# Patient Record
Sex: MALE | Race: WHITE | URBAN - METROPOLITAN AREA
[De-identification: names, ages, dates, MRNs, and addresses within clinical notes are randomized per-mention and may not be internally consistent; named-entity substitution may affect disease eponyms.]

---

## 2017-04-11 ENCOUNTER — EMERGENCY (EMERGENCY)
Facility: HOSPITAL | Age: 52
LOS: 0 days | Discharge: HOME | End: 2017-04-12

## 2017-04-11 DIAGNOSIS — M54.2 CERVICALGIA: ICD-10-CM

## 2017-04-11 PROBLEM — Z00.00 ENCOUNTER FOR PREVENTIVE HEALTH EXAMINATION: Status: ACTIVE | Noted: 2017-04-11

## 2017-06-27 DIAGNOSIS — E03.9 HYPOTHYROIDISM, UNSPECIFIED: ICD-10-CM

## 2017-06-27 DIAGNOSIS — M54.2 CERVICALGIA: ICD-10-CM
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
## 2017-06-27 DIAGNOSIS — R42 DIZZINESS AND GIDDINESS: ICD-10-CM

## 2017-06-27 DIAGNOSIS — M62.838 OTHER MUSCLE SPASM: ICD-10-CM
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
## 2020-10-23 ENCOUNTER — INPATIENT (INPATIENT)
Facility: HOSPITAL | Age: 55
LOS: 1 days | Discharge: HOME | End: 2020-10-25
Attending: INTERNAL MEDICINE | Admitting: INTERNAL MEDICINE
Payer: COMMERCIAL

## 2020-10-23 VITALS
RESPIRATION RATE: 16 BRPM | HEART RATE: 95 BPM | HEIGHT: 76 IN | OXYGEN SATURATION: 94 % | WEIGHT: 315 LBS | DIASTOLIC BLOOD PRESSURE: 88 MMHG | TEMPERATURE: 99 F | SYSTOLIC BLOOD PRESSURE: 149 MMHG

## 2020-10-23 LAB
ALBUMIN SERPL ELPH-MCNC: 4.2 G/DL — SIGNIFICANT CHANGE UP (ref 3.5–5.2)
ALP SERPL-CCNC: 82 U/L — SIGNIFICANT CHANGE UP (ref 30–115)
ALT FLD-CCNC: 33 U/L — SIGNIFICANT CHANGE UP (ref 0–41)
ANION GAP SERPL CALC-SCNC: 10 MMOL/L — SIGNIFICANT CHANGE UP (ref 7–14)
APTT BLD: 29.5 SEC — SIGNIFICANT CHANGE UP (ref 27–39.2)
AST SERPL-CCNC: 24 U/L — SIGNIFICANT CHANGE UP (ref 0–41)
BASOPHILS # BLD AUTO: 0.05 K/UL — SIGNIFICANT CHANGE UP (ref 0–0.2)
BASOPHILS NFR BLD AUTO: 0.3 % — SIGNIFICANT CHANGE UP (ref 0–1)
BILIRUB SERPL-MCNC: 0.5 MG/DL — SIGNIFICANT CHANGE UP (ref 0.2–1.2)
BLD GP AB SCN SERPL QL: SIGNIFICANT CHANGE UP
BUN SERPL-MCNC: 9 MG/DL — LOW (ref 10–20)
CALCIUM SERPL-MCNC: 9.6 MG/DL — SIGNIFICANT CHANGE UP (ref 8.5–10.1)
CHLORIDE SERPL-SCNC: 101 MMOL/L — SIGNIFICANT CHANGE UP (ref 98–110)
CO2 SERPL-SCNC: 24 MMOL/L — SIGNIFICANT CHANGE UP (ref 17–32)
CREAT SERPL-MCNC: 1.1 MG/DL — SIGNIFICANT CHANGE UP (ref 0.7–1.5)
EOSINOPHIL # BLD AUTO: 0 K/UL — SIGNIFICANT CHANGE UP (ref 0–0.7)
EOSINOPHIL NFR BLD AUTO: 0 % — SIGNIFICANT CHANGE UP (ref 0–8)
GLUCOSE BLDC GLUCOMTR-MCNC: 152 MG/DL — HIGH (ref 70–99)
GLUCOSE SERPL-MCNC: 122 MG/DL — HIGH (ref 70–99)
HCT VFR BLD CALC: 40.7 % — LOW (ref 42–52)
HCT VFR BLD CALC: 42.2 % — SIGNIFICANT CHANGE UP (ref 42–52)
HGB BLD-MCNC: 13.8 G/DL — LOW (ref 14–18)
HGB BLD-MCNC: 13.9 G/DL — LOW (ref 14–18)
IMM GRANULOCYTES NFR BLD AUTO: 0.4 % — HIGH (ref 0.1–0.3)
INR BLD: 1.12 RATIO — SIGNIFICANT CHANGE UP (ref 0.65–1.3)
LYMPHOCYTES # BLD AUTO: 0.78 K/UL — LOW (ref 1.2–3.4)
LYMPHOCYTES # BLD AUTO: 5.4 % — LOW (ref 20.5–51.1)
MAGNESIUM SERPL-MCNC: 2.2 MG/DL — SIGNIFICANT CHANGE UP (ref 1.8–2.4)
MCHC RBC-ENTMCNC: 30.5 PG — SIGNIFICANT CHANGE UP (ref 27–31)
MCHC RBC-ENTMCNC: 30.6 PG — SIGNIFICANT CHANGE UP (ref 27–31)
MCHC RBC-ENTMCNC: 32.9 G/DL — SIGNIFICANT CHANGE UP (ref 32–37)
MCHC RBC-ENTMCNC: 33.9 G/DL — SIGNIFICANT CHANGE UP (ref 32–37)
MCV RBC AUTO: 90.2 FL — SIGNIFICANT CHANGE UP (ref 80–94)
MCV RBC AUTO: 92.5 FL — SIGNIFICANT CHANGE UP (ref 80–94)
MONOCYTES # BLD AUTO: 0.16 K/UL — SIGNIFICANT CHANGE UP (ref 0.1–0.6)
MONOCYTES NFR BLD AUTO: 1.1 % — LOW (ref 1.7–9.3)
NEUTROPHILS # BLD AUTO: 13.29 K/UL — HIGH (ref 1.4–6.5)
NEUTROPHILS NFR BLD AUTO: 92.8 % — HIGH (ref 42.2–75.2)
NRBC # BLD: 0 /100 WBCS — SIGNIFICANT CHANGE UP (ref 0–0)
NRBC # BLD: 0 /100 WBCS — SIGNIFICANT CHANGE UP (ref 0–0)
PHOSPHATE SERPL-MCNC: 3 MG/DL — SIGNIFICANT CHANGE UP (ref 2.1–4.9)
PLATELET # BLD AUTO: 285 K/UL — SIGNIFICANT CHANGE UP (ref 130–400)
PLATELET # BLD AUTO: 294 K/UL — SIGNIFICANT CHANGE UP (ref 130–400)
POTASSIUM SERPL-MCNC: 4.5 MMOL/L — SIGNIFICANT CHANGE UP (ref 3.5–5)
POTASSIUM SERPL-SCNC: 4.5 MMOL/L — SIGNIFICANT CHANGE UP (ref 3.5–5)
PROT SERPL-MCNC: 7.1 G/DL — SIGNIFICANT CHANGE UP (ref 6–8)
PROTHROM AB SERPL-ACNC: 12.9 SEC — HIGH (ref 9.95–12.87)
RBC # BLD: 4.51 M/UL — LOW (ref 4.7–6.1)
RBC # BLD: 4.56 M/UL — LOW (ref 4.7–6.1)
RBC # FLD: 13.1 % — SIGNIFICANT CHANGE UP (ref 11.5–14.5)
RBC # FLD: 13.2 % — SIGNIFICANT CHANGE UP (ref 11.5–14.5)
SODIUM SERPL-SCNC: 135 MMOL/L — SIGNIFICANT CHANGE UP (ref 135–146)
WBC # BLD: 14.34 K/UL — HIGH (ref 4.8–10.8)
WBC # BLD: 16.33 K/UL — HIGH (ref 4.8–10.8)
WBC # FLD AUTO: 14.34 K/UL — HIGH (ref 4.8–10.8)
WBC # FLD AUTO: 16.33 K/UL — HIGH (ref 4.8–10.8)

## 2020-10-23 PROCEDURE — 70491 CT SOFT TISSUE NECK W/DYE: CPT | Mod: 26

## 2020-10-23 PROCEDURE — 99223 1ST HOSP IP/OBS HIGH 75: CPT

## 2020-10-23 PROCEDURE — 93010 ELECTROCARDIOGRAM REPORT: CPT

## 2020-10-23 PROCEDURE — 99291 CRITICAL CARE FIRST HOUR: CPT

## 2020-10-23 RX ORDER — SODIUM CHLORIDE 9 MG/ML
1000 INJECTION, SOLUTION INTRAVENOUS ONCE
Refills: 0 | Status: COMPLETED | OUTPATIENT
Start: 2020-10-23 | End: 2020-10-23

## 2020-10-23 RX ORDER — DEXAMETHASONE 0.5 MG/5ML
4 ELIXIR ORAL EVERY 6 HOURS
Refills: 0 | Status: DISCONTINUED | OUTPATIENT
Start: 2020-10-23 | End: 2020-10-24

## 2020-10-23 RX ORDER — IBUPROFEN 200 MG
600 TABLET ORAL ONCE
Refills: 0 | Status: COMPLETED | OUTPATIENT
Start: 2020-10-23 | End: 2020-10-23

## 2020-10-23 RX ORDER — PENICILLIN G BENZATHINE 1200000 [IU]/2ML
1.2 INJECTION, SUSPENSION INTRAMUSCULAR ONCE
Refills: 0 | Status: COMPLETED | OUTPATIENT
Start: 2020-10-23 | End: 2020-10-23

## 2020-10-23 RX ORDER — AMPICILLIN SODIUM AND SULBACTAM SODIUM 250; 125 MG/ML; MG/ML
3 INJECTION, POWDER, FOR SUSPENSION INTRAMUSCULAR; INTRAVENOUS ONCE
Refills: 0 | Status: COMPLETED | OUTPATIENT
Start: 2020-10-23 | End: 2020-10-23

## 2020-10-23 RX ORDER — AMPICILLIN SODIUM AND SULBACTAM SODIUM 250; 125 MG/ML; MG/ML
3 INJECTION, POWDER, FOR SUSPENSION INTRAMUSCULAR; INTRAVENOUS EVERY 6 HOURS
Refills: 0 | Status: DISCONTINUED | OUTPATIENT
Start: 2020-10-23 | End: 2020-10-23

## 2020-10-23 RX ORDER — DEXAMETHASONE 0.5 MG/5ML
4 ELIXIR ORAL EVERY 6 HOURS
Refills: 0 | Status: DISCONTINUED | OUTPATIENT
Start: 2020-10-23 | End: 2020-10-23

## 2020-10-23 RX ORDER — ATORVASTATIN CALCIUM 80 MG/1
40 TABLET, FILM COATED ORAL AT BEDTIME
Refills: 0 | Status: DISCONTINUED | OUTPATIENT
Start: 2020-10-23 | End: 2020-10-25

## 2020-10-23 RX ORDER — INFLUENZA VIRUS VACCINE 15; 15; 15; 15 UG/.5ML; UG/.5ML; UG/.5ML; UG/.5ML
0.5 SUSPENSION INTRAMUSCULAR ONCE
Refills: 0 | Status: DISCONTINUED | OUTPATIENT
Start: 2020-10-23 | End: 2020-10-25

## 2020-10-23 RX ORDER — AMPICILLIN SODIUM AND SULBACTAM SODIUM 250; 125 MG/ML; MG/ML
3 INJECTION, POWDER, FOR SUSPENSION INTRAMUSCULAR; INTRAVENOUS EVERY 6 HOURS
Refills: 0 | Status: DISCONTINUED | OUTPATIENT
Start: 2020-10-23 | End: 2020-10-25

## 2020-10-23 RX ORDER — KETOROLAC TROMETHAMINE 30 MG/ML
15 SYRINGE (ML) INJECTION EVERY 6 HOURS
Refills: 0 | Status: DISCONTINUED | OUTPATIENT
Start: 2020-10-23 | End: 2020-10-25

## 2020-10-23 RX ORDER — LEVOTHYROXINE SODIUM 125 MCG
200 TABLET ORAL DAILY
Refills: 0 | Status: DISCONTINUED | OUTPATIENT
Start: 2020-10-23 | End: 2020-10-25

## 2020-10-23 RX ORDER — ENOXAPARIN SODIUM 100 MG/ML
40 INJECTION SUBCUTANEOUS DAILY
Refills: 0 | Status: DISCONTINUED | OUTPATIENT
Start: 2020-10-23 | End: 2020-10-25

## 2020-10-23 RX ORDER — CHLORHEXIDINE GLUCONATE 213 G/1000ML
1 SOLUTION TOPICAL
Refills: 0 | Status: DISCONTINUED | OUTPATIENT
Start: 2020-10-23 | End: 2020-10-25

## 2020-10-23 RX ORDER — PANTOPRAZOLE SODIUM 20 MG/1
40 TABLET, DELAYED RELEASE ORAL DAILY
Refills: 0 | Status: DISCONTINUED | OUTPATIENT
Start: 2020-10-23 | End: 2020-10-24

## 2020-10-23 RX ORDER — DEXAMETHASONE 0.5 MG/5ML
10 ELIXIR ORAL ONCE
Refills: 0 | Status: COMPLETED | OUTPATIENT
Start: 2020-10-23 | End: 2020-10-23

## 2020-10-23 RX ORDER — AMPICILLIN SODIUM AND SULBACTAM SODIUM 250; 125 MG/ML; MG/ML
INJECTION, POWDER, FOR SUSPENSION INTRAMUSCULAR; INTRAVENOUS
Refills: 0 | Status: DISCONTINUED | OUTPATIENT
Start: 2020-10-23 | End: 2020-10-23

## 2020-10-23 RX ADMIN — Medication 10 MILLIGRAM(S): at 06:29

## 2020-10-23 RX ADMIN — Medication 4 MILLIGRAM(S): at 13:46

## 2020-10-23 RX ADMIN — AMPICILLIN SODIUM AND SULBACTAM SODIUM 200 GRAM(S): 250; 125 INJECTION, POWDER, FOR SUSPENSION INTRAMUSCULAR; INTRAVENOUS at 14:56

## 2020-10-23 RX ADMIN — AMPICILLIN SODIUM AND SULBACTAM SODIUM 200 GRAM(S): 250; 125 INJECTION, POWDER, FOR SUSPENSION INTRAMUSCULAR; INTRAVENOUS at 22:45

## 2020-10-23 RX ADMIN — Medication 600 MILLIGRAM(S): at 03:01

## 2020-10-23 RX ADMIN — Medication 600 MILLIGRAM(S): at 07:26

## 2020-10-23 RX ADMIN — PENICILLIN G BENZATHINE 1.2 MILLION UNIT(S): 1200000 INJECTION, SUSPENSION INTRAMUSCULAR at 03:00

## 2020-10-23 RX ADMIN — Medication 100 MILLIGRAM(S): at 06:29

## 2020-10-23 RX ADMIN — PANTOPRAZOLE SODIUM 40 MILLIGRAM(S): 20 TABLET, DELAYED RELEASE ORAL at 13:45

## 2020-10-23 RX ADMIN — ATORVASTATIN CALCIUM 40 MILLIGRAM(S): 80 TABLET, FILM COATED ORAL at 22:45

## 2020-10-23 RX ADMIN — Medication 4 MILLIGRAM(S): at 18:29

## 2020-10-23 RX ADMIN — SODIUM CHLORIDE 1000 MILLILITER(S): 9 INJECTION, SOLUTION INTRAVENOUS at 03:00

## 2020-10-23 RX ADMIN — AMPICILLIN SODIUM AND SULBACTAM SODIUM 200 GRAM(S): 250; 125 INJECTION, POWDER, FOR SUSPENSION INTRAMUSCULAR; INTRAVENOUS at 09:01

## 2020-10-23 NOTE — ED ADULT NURSE NOTE - OBJECTIVE STATEMENT
pt reports sore throat starting 3 days ago after travel back from [place]. pain to throat with swallowing

## 2020-10-23 NOTE — CONSULT NOTE ADULT - ATTENDING COMMENTS
patient seen and examined at bedside. CT images reviewed and discussed.    Phlegmon vs mass,    ABX, repeat CT in 2 weeks.
Seen and examined with the pulmonary fellow at the bed side.  Impression and plan as outlined above.

## 2020-10-23 NOTE — ED PROVIDER NOTE - CARE PLAN
Principal Discharge DX:	Throat swelling  Secondary Diagnosis:	Sore throat  Secondary Diagnosis:	Throat mass

## 2020-10-23 NOTE — ED ADULT TRIAGE NOTE - CHIEF COMPLAINT QUOTE
pt c.o. sore throat and "not feeling well" since yesterday  pt returned on 10/20/2020 from [place]

## 2020-10-23 NOTE — CONSULT NOTE ADULT - ASSESSMENT
IMPRESSION:  Tonsillitis, no abscess seen on 1st scope  Probable EDY  HO Hypothyroid    PLAN:    CNS: pain control with Ketorlac, avoid oversedation    HEENT: Oral care. Decadron IV 4mg q6h. f/u With ENT    PULMONARY:  HOB @ 45 degrees.  Aspiration precautions. O/P pulm follow up for Sleep study    CARDIOVASCULAR: MAP >65.     GI: GI prophylaxis.  NPO for now, follow up with ENT about feeding.  Bowel regimen     RENAL:  Follow up lytes.  Correct as needed    INFECTIOUS DISEASE: Follow up cultures. Unasyn for now    HEMATOLOGICAL:  DVT prophylaxis.     ENDOCRINE:  Follow up FS.  Insulin protocol if needed. Continue home synthroid and     MUSCULOSKELETAL: OOBTC    Dispo:         IMPRESSION:  Sepsis POA (tachy, increased WBC)  Tonsillitis, no abscess seen on 1st scope  Probable EDY  HO Hypothyroid    PLAN:    CNS: pain control with Ketorlac, avoid oversedation    HEENT: Oral care. Decadron IV 4mg q6h. f/u With ENT    PULMONARY:  HOB @ 45 degrees.  Aspiration precautions. O/P pulm follow up for Sleep study    CARDIOVASCULAR: MAP >65.     GI: GI prophylaxis.  NPO for now, follow up with ENT about feeding.  Bowel regimen     RENAL:  Follow up lytes.  Correct as needed    INFECTIOUS DISEASE: Follow up cultures. Unasyn for now    HEMATOLOGICAL:  DVT prophylaxis.     ENDOCRINE:  Follow up FS.  Insulin protocol if needed. Continue home synthroid and     MUSCULOSKELETAL: OOBTC    Dispo:         IMPRESSION:  Sepsis POA (tachy, increased WBC)  Tonsillitis, no abscess seen on 1st scope  Probable EDY  HO Hypothyroid    PLAN:    CNS: pain control with Ketorlac, avoid oversedation    HEENT: Oral care. Decadron IV 4mg q6h. f/u With ENT    PULMONARY:  HOB @ 45 degrees.  Aspiration precautions. O/P pulm follow up for Sleep study    CARDIOVASCULAR: MAP >65.     GI: GI prophylaxis.  NPO for now, follow up with ENT about feeding.  Bowel regimen     RENAL:  Follow up lytes.  Correct as needed    INFECTIOUS DISEASE: Follow up cultures. Unasyn for now    HEMATOLOGICAL:  DVT prophylaxis.     ENDOCRINE:  Follow up FS.  Insulin protocol if needed. Continue home synthroid and     MUSCULOSKELETAL: OOBTC    Dispo: MICU for now         IMPRESSION:  Sepsis POA (tachy, increased WBC)  Tonsillitis, no abscess  Probable EDY  HO Hypothyroid    PLAN:    CNS: NO depressants     HEENT: Oral care. DC Decadron. F/u With ENT    PULMONARY:  HOB @ 45 degrees.  Aspiration precautions. O/P pulm follow up for Sleep study    CARDIOVASCULAR: MAP >65. I=O     GI: GI prophylaxis.  Feeding as tolerated      RENAL:  Follow up lytes.  Correct as needed    INFECTIOUS DISEASE: Follow up cultures. Unasyn for now    HEMATOLOGICAL:  DVT prophylaxis.     ENDOCRINE:  Follow up FS.  Insulin protocol if needed. Continue home synthroid     MUSCULOSKELETAL: OOBTC    Dispo: Transfer to floor

## 2020-10-23 NOTE — ED PROVIDER NOTE - OBJECTIVE STATEMENT
55 y.o. male, no PMH, comes in c/o chills, sore throat, feeling like something is stuck in his throat and difficulty swallowing. No cough. No CP/SOB/abdominal pain. 55 y.o. male, no PMH, comes in c/o chills, sore throat, feeling like something is stuck in his throat and difficulty swallowing. No cough. (+) hoarse voice. No CP/SOB/abdominal pain. No n/v/c/d. No leg pain/swelling.

## 2020-10-23 NOTE — CONSULT NOTE ADULT - SUBJECTIVE AND OBJECTIVE BOX
Patient is a 55y old  Male who presents with a chief complaint of difficulty breathing and sore throat    HPI:  55 year old male with PMH of Hypothyroidism and DLD p/w 2 day h/o sore throat, and subjective fevers. Patient states that his sore throat has been worsening and overnight was having some difficulty breathing came to the ER for further evaluation. CT scan done with prelim read showing Questionable soft tissue lesion versus tonsillar hypertrophy at the level of the lingual tonsil/supraglottic larynx measuring 2 x 1.2 cm causing mass effect on the airway. Also noted are prominent level 1B/2A lymph nodes measuring up to 1.3 cm. Recommend direct visualization. final report read as The palatine tonsils are enlarged (left greater than right) with a striated enhancement pattern consistent with tonsillitis with moderate narrowing of the airway. Bilateral mildly enlarged cervical lymph nodes likely reactive. Patient seen by ENT and scoped, showed some erythema, mild swelling and no airway compromise.       PAST MEDICAL & SURGICAL HISTORY:      SOCIAL HX:   Smoking occasional                        ETOH      drinks occasionally but when he does usually heavy drinking (~ 6 shots)                      Other    FAMILY HISTORY:  :  No known cardiovacular family hisotry     Review Of Systems:     All ROS are negative except per HPI       Allergies    No Known Allergies    Intolerances          PHYSICAL EXAM    ICU Vital Signs Last 24 Hrs  T(C): 36.8 (23 Oct 2020 07:23), Max: 37.2 (23 Oct 2020 01:31)  T(F): 98.2 (23 Oct 2020 07:23), Max: 99 (23 Oct 2020 01:31)  HR: 75 (23 Oct 2020 07:23) (75 - 95)  BP: 112/80 (23 Oct 2020 07:23) (112/80 - 149/88)  BP(mean): --  ABP: --  ABP(mean): --  RR: 19 (23 Oct 2020 07:23) (16 - 19)  SpO2: 95% (23 Oct 2020 07:23) (94% - 96%)      CONSTITUTIONAL:  Well nourished.   NAD    ENT:   Airway patent,   Mouth with erythematous mucosa.   No thrush    CARDIAC:   Normal rate,   Regular rhythm.    +1 edema    Vascular:   Normal systolic impulse    RESPIRATORY:   No wheezing  Bilateral BS   Not tachypneic,  No use of accessory muscles    GASTROINTESTINAL:  Abdomen soft,   Non-tender,   No guarding,     NEUROLOGICAL:   Alert and oriented   No motor deficits.    SKIN:   Skin normal color for race,   No evidence of rash.      HEME LYMPH: .  + cervical  lymphadenopathy.              LABS:                          13.8   16.33 )-----------( 294      ( 23 Oct 2020 02:55 )             40.7                                               10-23    135  |  101  |  9<L>  ----------------------------<  122<H>  4.5   |  24  |  1.1    Ca    9.6      23 Oct 2020 02:55    TPro  7.1  /  Alb  4.2  /  TBili  0.5  /  DBili  x   /  AST  24  /  ALT  33  /  AlkPhos  82  10-23                                                                                           LIVER FUNCTIONS - ( 23 Oct 2020 02:55 )  Alb: 4.2 g/dL / Pro: 7.1 g/dL / ALK PHOS: 82 U/L / ALT: 33 U/L / AST: 24 U/L / GGT: x                                                                                                                                       X-Rays reviewed                                                                                     ECHO    CXR interpreted by me     MEDICATIONS  (STANDING):  ampicillin/sulbactam  IVPB      ampicillin/sulbactam  IVPB 3 Gram(s) IV Intermittent every 6 hours  chlorhexidine 4% Liquid 1 Application(s) Topical <User Schedule>  pantoprazole  Injectable 40 milliGRAM(s) IV Push daily    MEDICATIONS  (PRN):      < from: CT Neck Soft Tissue w/ IV Cont (10.23.20 @ 05:20) >  The palatine tonsils are enlarged (left greater than right) with a striated enhancement pattern consistent with tonsillitis with moderate narrowing of the airway. Bilateral mildly enlarged cervical lymph nodes likely reactive.    < end of copied text >

## 2020-10-23 NOTE — PROGRESS NOTE ADULT - SUBJECTIVE AND OBJECTIVE BOX
ENT DAILY PROGRESS NOTE    Pt is a 55y Male a/w throat pain, tonsillitis (?) - seen and examined at bedside. Pt doing well - sleeping comfortably. Pt states his pain has improved since admission, able to tolerate his saliva better. Pt denies any difficulty breathing or shortness of breath.      REVIEW OF SYSTEMS   [x] A ten-point review of systems was otherwise negative except as noted.  [ ] Due to altered mental status/intubation, subjective information were not able to be obtained from patient. History was obtained, to the extent possible, from review of the chart and collateral sources of information.    Allergies    No Known Allergies    Intolerances        MEDICATIONS:  chlorhexidine 4% Liquid 1 Application(s) Topical <User Schedule>  clindamycin IVPB 600 milliGRAM(s) IV Intermittent every 8 hours  pantoprazole  Injectable 40 milliGRAM(s) IV Push daily      Vital Signs Last 24 Hrs  T(C): 36.8 (23 Oct 2020 07:23), Max: 37.2 (23 Oct 2020 01:31)  T(F): 98.2 (23 Oct 2020 07:23), Max: 99 (23 Oct 2020 01:31)  HR: 80 (23 Oct 2020 09:22) (75 - 95)  BP: 120/78 (23 Oct 2020 09:22) (112/80 - 149/88)  RR: 17 (23 Oct 2020 09:22) (16 - 19)  SpO2: 96% (23 Oct 2020 09:22) (94% - 96%)      PHYSICAL EXAM:    GEN: Well-developed, well-nourished. NAD, awake and alert. No drooling or pooling of secretions. No stridor or stertor. Good vocal quality, no hoarseness.   SKIN: Good color, non diaphoretic  HEENT: NC/AT; Oral mucosa pink and moist. + erythema or edema noted to L tonsil > R. no erythema, edema or bulging noted to the peritonsillar space. no erythema or edema to buccal mucosa, tongue, FOM, uvula or posterior oropharynx. Uvula midline  NECK:  Trachea midline. Neck supple, mild TTP to L lateral neck, no cervical LAD.  RESP: No dyspnea, non-labored breathing. No use of accessory muscles.  CARDIO: +S1/S2  ABDO: Soft, NT.  EXT: LESLIE x 4    LABS:  CBC-                        13.8   16.33 )-----------( 294      ( 23 Oct 2020 02:55 )             40.7     BMP/CMP-  23 Oct 2020 02:55    135    |  101    |  9      ----------------------------<  122    4.5     |  24     |  1.1      Ca    9.6        23 Oct 2020 02:55    TPro  7.1    /  Alb  4.2    /  TBili  0.5    /  DBili  x      /  AST  24     /  ALT  33     /  AlkPhos  82     23 Oct 2020 02:55    Coagulation Studies-    Endocrine Panel-  Calcium, Total Serum: 9.6 mg/dL (10-23 @ 02:55)    RADIOLOGY & ADDITIONAL STUDIES:    < from: CT Neck Soft Tissue w/ IV Cont (10.23.20 @ 05:20) >  EXAM:  CT NECK SOFT TISSUE IC            PROCEDURE DATE:  10/23/2020        INTERPRETATION:  Clinical History / Reason for exam: Sore throat, difficulty swallowing, sensation of something stuck in the throat.    Technique: Multiple contiguous axial images were obtained of the soft tissues of the neck from the superior aspect of the frontal sinuses through to the porter after the intravenous administration of  100 cc Omnipaque 350. Sagittal and coronal reformatted images were submitted.    Comparison: CTA neck dated 4/12/2017.    Findings:    Streak artifact from the patient's dental apparatus limits evaluation at the level of the soft palate and tongue.    The tonsils are enlarged (left greater than right) and there is a striated enhancing pattern consistent with tonsillitis. No discrete enhancing fluid collection is identified. There is moderate narrowing of the airway. Punctate dystrophic calcification within the left tonsil.    Bilateral enlarged level II cervical lymph nodes likely reactive.    Minimal mucosal thickening of the right maxillary sinus.    The parotid glands and submandibular glands are normal in size and CT attenuation.    Mildly enlarged right lobe of the thyroid gland.    Impression:    The palatine tonsils are enlarged (left greater than right) with a striated enhancement pattern consistent with tonsillitis with moderate narrowing of the airway. Bilateral mildly enlarged cervical lymph nodes likely reactive.    Dr. Rissa Saeed discussed preliminaryfindings with GOLDY BOWEN DO on 10/23/2020 5:44 AM with readback.    Final report: Spoke with JERAMY KANG on 10/23/2020 8:53 AM with readback.      RISSA SAEED M.D., RESIDENT RADIOLOGIST  This document has been electronically signed.  ATA CARDENAS MD; Attending Radiologist  This document has been electronically signed. Oct 23 2020  9:00AM

## 2020-10-23 NOTE — ED PROVIDER NOTE - NS ED ROS FT
Denies nausea, vomiting, diarrhea, constipation, abdominal pain, urinary symptoms, chest pain, palpitations, shortness of breath, dyspnea on exertion, syncope/near syncope, cough/URI symptoms, headache, weakness, numbness, focal deficits, visual changes, gait or balance changes, dizziness

## 2020-10-23 NOTE — ED ADULT NURSE NOTE - CHIEF COMPLAINT QUOTE
pt c.o. sore throat and "not feeling well" since yesterday  pt returned on 10/20/2020 from [place]

## 2020-10-23 NOTE — ED PROVIDER NOTE - PHYSICAL EXAMINATION
pt in NAD,   AAOx3,   head NC/AT,   CN II-XII intact,   throat (+) swelling to left tonsil, (+) exudates to posterior aspect, uvula midline, tongue normal size, (-) swelling to throat of the mouth      lungs CTA B/L, CV S1S2 regular, abdomen soft/NT/ND/(+)BS, ext (-) edema. motor 5/5x4, sensation intact pt in NAD,   AAOx3,   head NC/AT,   CN II-XII intact,   throat (+) swelling to left tonsil, (+) exudates to posterior aspect, uvula midline, tongue normal size, (-) swelling to throat of the mouth  (+) cervical LAD  lungs CTA B/L,   CV S1S2 regular,   abdomen soft/NT/ND/(+)BS,   ext (-) edema,   motor 5/5x4, sensation intact

## 2020-10-23 NOTE — H&P ADULT - HISTORY OF PRESENT ILLNESS
Patient is a 55 year old male with PMH of Hypothyroidism, DLD presented to the ED with chief complaint of sore throat, weakness, myalgias for last 2-3days. Patient was having sensation that his airway was collapsing so he decided to come to the ED.  Patient denied fever, chills, recent facial infections, sinus infections, cough, shortness of breath. Patient has history of strep throat several episodes.   In ED, /88mm Hg, HR 95/min, 99F, saturating at 94%. Labs showed WBC 16K. CT scan showed enlarged palatine tonsils (left greater than right) with a striated enhancement pattern consistent with tonsillitis with moderate narrowing of the airway. ENT scoped, showed some erythema, mild swelling and no airway compromise.

## 2020-10-23 NOTE — CONSULT NOTE ADULT - SUBJECTIVE AND OBJECTIVE BOX
HPI: Pt is a 54y/o M presenting with 1 day h/o sore throat, chills, and something stuck in the back of his throat. ENT consulted for possible peritonsillar abscess. Pt seen and examined at bedside. Admits to muffled voice, chills, sore throat, denies fevers     Allergies  No Known Allergies    ROS:   ENT: all negative except as noted in HPI   CV: denies palpitations  Pulm: denies SOB, cough, hemoptysis  GI: denies change in apetite, indigestion, n/v  : denies pertinent urinary symptoms, urgency  Neuro: denies numbness/tingling, loss of sensation  Psych: denies anxiety  MS: denies muscle weakness, instability  Heme: denies easy bruising or bleeding  Endo: denies heat/cold intolerance, excessive sweating  Vascular: denies LE edema    Vital Signs Last 24 Hrs  T(C): 37.2 (23 Oct 2020 06:47), Max: 37.2 (23 Oct 2020 01:31)  T(F): 98.9 (23 Oct 2020 06:47), Max: 99 (23 Oct 2020 01:31)  HR: 76 (23 Oct 2020 06:47) (76 - 95)  BP: 119/78 (23 Oct 2020 06:47) (119/78 - 149/88)  BP(mean): --  RR: 18 (23 Oct 2020 06:47) (16 - 18)  SpO2: 96% (23 Oct 2020 06:47) (94% - 96%)                          13.8   16.33 )-----------( 294      ( 23 Oct 2020 02:55 )             40.7    10-23    135  |  101  |  9<L>  ----------------------------<  122<H>  4.5   |  24  |  1.1    Ca    9.6      23 Oct 2020 02:55    TPro  7.1  /  Alb  4.2  /  TBili  0.5  /  DBili  x   /  AST  24  /  ALT  33  /  AlkPhos  82  10-23       PHYSICAL EXAM:  Gen: awake, alert, NAD. No drooling or pooling of secretions. No trismus.   Skin: No rashes, bruises, or lesions  HEENT: Head NC/AT. B/l EACs clear, TMs intact. Nares bilaterally patent, no blood or discharge noted. Oral cavity no erythema/edema. Tongue wnl. Uvula midline. Posterior oropharynx clear, no discharge/blood noted. Neck supple, trachea midline.   Resp: breathing easily, no stridor, no accessory muscle use   CV: no peripheral edema/cyanosis  GI: soft, nontender, nondistended  Neuro: A&Ox3  Psych: normal mood, normal affect    IMAGING/ADDITIONAL STUDIES: HPI: Pt is a 54y/o M presenting with 1 day h/o sore throat, chills, and something stuck in the back of his throat. ENT consulted for possible peritonsillar abscess. Pt seen and examined at bedside. Admits to muffled voice, chills, sore throat, denies fevers, nausea, vomiting, difficulty swallowing, difficulty breathing, or SOB at this time.     Allergies  No Known Allergies    ROS:   ENT: all negative except as noted in HPI   CV: denies palpitations  Pulm: denies SOB, cough, hemoptysis  GI: denies change in apetite, indigestion, n/v  : denies pertinent urinary symptoms, urgency  Neuro: denies numbness/tingling, loss of sensation  Psych: denies anxiety  MS: denies muscle weakness, instability  Heme: denies easy bruising or bleeding  Endo: denies heat/cold intolerance, excessive sweating  Vascular: denies LE edema    Vital Signs Last 24 Hrs  T(C): 37.2 (23 Oct 2020 06:47), Max: 37.2 (23 Oct 2020 01:31)  T(F): 98.9 (23 Oct 2020 06:47), Max: 99 (23 Oct 2020 01:31)  HR: 76 (23 Oct 2020 06:47) (76 - 95)  BP: 119/78 (23 Oct 2020 06:47) (119/78 - 149/88)  RR: 18 (23 Oct 2020 06:47) (16 - 18)  SpO2: 96% (23 Oct 2020 06:47) (94% - 96%)                        13.8   16.33 )-----------( 294      ( 23 Oct 2020 02:55 )             40.7    10-23    135  |  101  |  9<L>  ----------------------------<  122<H>  4.5   |  24  |  1.1    Ca    9.6      23 Oct 2020 02:55  TPro  7.1  /  Alb  4.2  /  TBili  0.5  /  DBili  x   /  AST  24  /  ALT  33  /  AlkPhos  82  10-23    PHYSICAL EXAM:  Gen: awake, alert, NAD. No drooling or pooling of secretions. No trismus.   Skin: No rashes, bruises, or lesions  HEENT: Head NC/AT.  Nares bilaterally patent, no blood or discharge noted. Oral cavity no erythema/edema. Tongue wnl. Uvula midline. Posterior oropharynx - Left tonsil enlarged with edema to tissues surrounding, mild exudates. Neck supple, trachea midline.   Resp: breathing easily, no stridor, no accessory muscle use   CV: no peripheral edema/cyanosis  GI: soft, nontender, nondistended  Neuro: A&Ox3  Psych: normal mood, normal affect    IMAGING/ADDITIONAL STUDIES:   < from: CT Neck Soft Tissue w/ IV Cont (10.23.20 @ 05:20) >    ******PRELIMINARY REPORT******    ******PRELIMINARY REPORT******          EXAM:  CT NECK SOFT TISSUE IC            PROCEDURE DATE:  10/23/2020          ******PRELIMINARY REPORT******    ******PRELIMINARY REPORT******          INTERPRETATION:  CLINICAL HISTORY: Left tonsillar swelling.    Technique: Multiple contiguous axial images were obtained of the soft tissues of the neck from the superior aspect of the frontal sinuses through to the porter after the intravenous administration of  100 cc Omnipaque 350.    Comparison: CTA soft tissue neck 4/12/2017.    Findings:  Visualized portions of the brain demonstrate no abnormal enhancing masses or lesions. Minimal mucosal thickening of the right maxillary sinus, otherwise visualized paranasal sinuses are well-aerated. The bilateral mastoid air complexes are clear.  Questionable soft tissue lesion versus tonsillar hypertrophy at the level of the lingual tonsils/supraglottic larynx (at the level of C2) measuring 2 x 1.2 cm causing mass effect on the airway. There are prominent level 1b/2A  lymph nodes measuring up to 1.3 cm (3/49).  Globes and lenses are intact.  Bilateral optic nerves and extraocular muscles are symmetric and within normal limits.  Bilateral parotid, submandibular,and sublingual glands are symmetric and enhance homogeneously.  The adenoidal soft tissues, lingual tonsils and palatine tonsils are grossly unremarkable.  The soft tissues of the oral cavity are obscured by metallic streaking artifact.  Soft tissues of the floor of the mouth and tongue base are symmetric and within normal limits.  The pre-epiglottic space is clear.  Bilateral aryepiglottic folds are within normal limits bilateral pyriform sinuses are patent.  The true and false vocal cordsare within normal limits.  Mildly enlarged right thyroid gland. The great vessels of the neck are unremarkable.  Straightening of the normal curvature of the cervical spine. Multilevel degenerative changes of the cervical spine.  Bilateral upper lungs are clear.  The upper heart and mediastinum are within normal limits.    Impression:  Questionable soft tissue lesion versus tonsillar hypertrophy at the level of the lingual tonsil/supraglottic larynx measuring 2 x 1.2 cm causing mass effect on the airway. Also noted are prominent level 1B/2A lymph nodes measuring up to 1.3 cm.  Recommend direct visualization.  Dr. Rissa Saeed discussed preliminary findings with GOLDY BOWEN DO on 10/23/2020 5:44 AM with readback.    ******PRELIMINARY REPORT******    ******PRELIMINARY REPORT******          RISSA SAEED M.D., RESIDENT RADIOLOGIST HPI: Pt is a 56y/o M presenting with 1 day h/o sore throat, chills, and something stuck in the back of his throat. ENT consulted for possible peritonsillar abscess. Pt seen and examined at bedside. Admits to muffled voice, chills, sore throat, denies fevers, nausea, vomiting, difficulty swallowing, difficulty breathing, or SOB at this time.     Allergies  No Known Allergies    ROS:   ENT: all negative except as noted in HPI   CV: denies palpitations  Pulm: denies SOB, cough, hemoptysis  GI: denies change in apetite, indigestion, n/v  : denies pertinent urinary symptoms, urgency  Neuro: denies numbness/tingling, loss of sensation  Psych: denies anxiety  MS: denies muscle weakness, instability  Heme: denies easy bruising or bleeding  Endo: denies heat/cold intolerance, excessive sweating  Vascular: denies LE edema    Vital Signs Last 24 Hrs  T(C): 37.2 (23 Oct 2020 06:47), Max: 37.2 (23 Oct 2020 01:31)  T(F): 98.9 (23 Oct 2020 06:47), Max: 99 (23 Oct 2020 01:31)  HR: 76 (23 Oct 2020 06:47) (76 - 95)  BP: 119/78 (23 Oct 2020 06:47) (119/78 - 149/88)  RR: 18 (23 Oct 2020 06:47) (16 - 18)  SpO2: 96% (23 Oct 2020 06:47) (94% - 96%)                        13.8   16.33 )-----------( 294      ( 23 Oct 2020 02:55 )             40.7    10-23    135  |  101  |  9<L>  ----------------------------<  122<H>  4.5   |  24  |  1.1    Ca    9.6      23 Oct 2020 02:55  TPro  7.1  /  Alb  4.2  /  TBili  0.5  /  DBili  x   /  AST  24  /  ALT  33  /  AlkPhos  82  10-23    PHYSICAL EXAM:  Gen: awake, alert, NAD. No drooling or pooling of secretions. No trismus.   Skin: No rashes, bruises, or lesions  HEENT: Head NC/AT.  Nares bilaterally patent, no blood or discharge noted. Oral cavity no erythema/edema. Tongue wnl. Uvula midline. Posterior oropharynx - Left tonsil enlarged with edema to tissues surrounding, mild exudates. Neck supple, trachea midline.   Resp: breathing easily, no stridor, no accessory muscle use   CV: no peripheral edema/cyanosis  GI: soft, nontender, nondistended  Neuro: A&Ox3  Psych: normal mood, normal affect    FFL done at bedside: no masses noted to nasopharynx, +swelling to posterior oropharynx extending down left laryngeal wall, no masses to HP, VC approximate well.    IMAGING/ADDITIONAL STUDIES:   < from: CT Neck Soft Tissue w/ IV Cont (10.23.20 @ 05:20) >    ******PRELIMINARY REPORT******    ******PRELIMINARY REPORT******          EXAM:  CT NECK SOFT TISSUE IC            PROCEDURE DATE:  10/23/2020        ******PRELIMINARY REPORT******    ******PRELIMINARY REPORT******          INTERPRETATION:  CLINICAL HISTORY: Left tonsillar swelling.    Technique: Multiple contiguous axial images were obtained of the soft tissues of the neck from the superior aspect of the frontal sinuses through to the porter after the intravenous administration of  100 cc Omnipaque 350.    Comparison: CTA soft tissue neck 4/12/2017.    Findings:  Visualized portions of the brain demonstrate no abnormal enhancing masses or lesions. Minimal mucosal thickening of the right maxillary sinus, otherwise visualized paranasal sinuses are well-aerated. The bilateral mastoid air complexes are clear.  Questionable soft tissue lesion versus tonsillar hypertrophy at the level of the lingual tonsils/supraglottic larynx (at the level of C2) measuring 2 x 1.2 cm causing mass effect on the airway. There are prominent level 1b/2A  lymph nodes measuring up to 1.3 cm (3/49).  Globes and lenses are intact.  Bilateral optic nerves and extraocular muscles are symmetric and within normal limits.  Bilateral parotid, submandibular,and sublingual glands are symmetric and enhance homogeneously.  The adenoidal soft tissues, lingual tonsils and palatine tonsils are grossly unremarkable.  The soft tissues of the oral cavity are obscured by metallic streaking artifact.  Soft tissues of the floor of the mouth and tongue base are symmetric and within normal limits.  The pre-epiglottic space is clear.  Bilateral aryepiglottic folds are within normal limits bilateral pyriform sinuses are patent.  The true and false vocal cordsare within normal limits.  Mildly enlarged right thyroid gland. The great vessels of the neck are unremarkable.  Straightening of the normal curvature of the cervical spine. Multilevel degenerative changes of the cervical spine.  Bilateral upper lungs are clear.  The upper heart and mediastinum are within normal limits.    Impression:  Questionable soft tissue lesion versus tonsillar hypertrophy at the level of the lingual tonsil/supraglottic larynx measuring 2 x 1.2 cm causing mass effect on the airway. Also noted are prominent level 1B/2A lymph nodes measuring up to 1.3 cm.  Recommend direct visualization.  Dr. Rissa Saeed discussed preliminary findings with GOLDY BOWEN DO on 10/23/2020 5:44 AM with readback.    ******PRELIMINARY REPORT******    ******PRELIMINARY REPORT******          RISSA SAEED M.D., RESIDENT RADIOLOGIST

## 2020-10-23 NOTE — CONSULT NOTE ADULT - ASSESSMENT
54y/o M with Left tonsillar hypertrophy vs soft tissue mass     - Decadron 10mg x 1, then 8mg q6h x 2 more doses   - IV Clindamycin  - Will re-scope with attending during rounds.

## 2020-10-23 NOTE — PROGRESS NOTE ADULT - ASSESSMENT
Pt is a 55 y.o male with tonsillitis (?) - clinically feeling better with antibiotics and steroids    - cont IV abx  - hold off on Decadron for now  - soft diet as tolerated  - w/d with attng

## 2020-10-23 NOTE — ED PROVIDER NOTE - CLINICAL SUMMARY MEDICAL DECISION MAKING FREE TEXT BOX
Pt with enlarged palatine tonsil with moderate narrowing of the airway. Will admit to ICU for airway monitoring, IV abx and IV steroids.

## 2020-10-23 NOTE — H&P ADULT - ASSESSMENT
Patient is a 55 year old male with PMH of Hypothyroidism, DLD presented to the ED with chief complaint of sore throat, weakness, myalgias for last 2-3days.     ASSESSMENT AND PLAN:    IMPRESSION:      PLAN:    CNS: Avoid Sedatives, pain control with toradol    HEENT: Oral care. Decadron IV 4mg q6h. Enlarged tonsils visible on left side with whitish exudates    PULMONARY: continue decadron. Aspiration precautions. O/P pulm follow up for Sleep study    CARDIOVASCULAR: continue statins for dyslipidemia    GI: GI prophylaxis not indicated, mechanical soft diet    RENAL: I=0, monitor electrolytes and replete as needed    INFECTIOUS DISEASE: Continue Unasyn, Send Blood cultures if febrile    HEMATOLOGICAL:  DVT prophylaxis, Active type and screen    ENDOCRINE:  monitor blood glucose on BMP, continue levothyroxine     #Dispo: ICU monitoring

## 2020-10-24 LAB
ALBUMIN SERPL ELPH-MCNC: 4.1 G/DL — SIGNIFICANT CHANGE UP (ref 3.5–5.2)
ALP SERPL-CCNC: 79 U/L — SIGNIFICANT CHANGE UP (ref 30–115)
ALT FLD-CCNC: 28 U/L — SIGNIFICANT CHANGE UP (ref 0–41)
ANION GAP SERPL CALC-SCNC: 11 MMOL/L — SIGNIFICANT CHANGE UP (ref 7–14)
AST SERPL-CCNC: 15 U/L — SIGNIFICANT CHANGE UP (ref 0–41)
BILIRUB SERPL-MCNC: 0.3 MG/DL — SIGNIFICANT CHANGE UP (ref 0.2–1.2)
BUN SERPL-MCNC: 11 MG/DL — SIGNIFICANT CHANGE UP (ref 10–20)
CALCIUM SERPL-MCNC: 9.2 MG/DL — SIGNIFICANT CHANGE UP (ref 8.5–10.1)
CHLORIDE SERPL-SCNC: 104 MMOL/L — SIGNIFICANT CHANGE UP (ref 98–110)
CO2 SERPL-SCNC: 22 MMOL/L — SIGNIFICANT CHANGE UP (ref 17–32)
CREAT SERPL-MCNC: 0.9 MG/DL — SIGNIFICANT CHANGE UP (ref 0.7–1.5)
GLUCOSE SERPL-MCNC: 143 MG/DL — HIGH (ref 70–99)
HCT VFR BLD CALC: 41.3 % — LOW (ref 42–52)
HGB BLD-MCNC: 13.7 G/DL — LOW (ref 14–18)
MAGNESIUM SERPL-MCNC: 2.1 MG/DL — SIGNIFICANT CHANGE UP (ref 1.8–2.4)
MCHC RBC-ENTMCNC: 30.6 PG — SIGNIFICANT CHANGE UP (ref 27–31)
MCHC RBC-ENTMCNC: 33.2 G/DL — SIGNIFICANT CHANGE UP (ref 32–37)
MCV RBC AUTO: 92.2 FL — SIGNIFICANT CHANGE UP (ref 80–94)
NRBC # BLD: 0 /100 WBCS — SIGNIFICANT CHANGE UP (ref 0–0)
PLATELET # BLD AUTO: 288 K/UL — SIGNIFICANT CHANGE UP (ref 130–400)
POTASSIUM SERPL-MCNC: 4.4 MMOL/L — SIGNIFICANT CHANGE UP (ref 3.5–5)
POTASSIUM SERPL-SCNC: 4.4 MMOL/L — SIGNIFICANT CHANGE UP (ref 3.5–5)
PROT SERPL-MCNC: 6.7 G/DL — SIGNIFICANT CHANGE UP (ref 6–8)
RBC # BLD: 4.48 M/UL — LOW (ref 4.7–6.1)
RBC # FLD: 13 % — SIGNIFICANT CHANGE UP (ref 11.5–14.5)
SARS-COV-2 IGG SERPL QL IA: NEGATIVE — SIGNIFICANT CHANGE UP
SARS-COV-2 IGM SERPL IA-ACNC: <0.1 INDEX — SIGNIFICANT CHANGE UP
SARS-COV-2 RNA SPEC QL NAA+PROBE: SIGNIFICANT CHANGE UP
SODIUM SERPL-SCNC: 137 MMOL/L — SIGNIFICANT CHANGE UP (ref 135–146)
WBC # BLD: 14.98 K/UL — HIGH (ref 4.8–10.8)
WBC # FLD AUTO: 14.98 K/UL — HIGH (ref 4.8–10.8)

## 2020-10-24 RX ORDER — PANTOPRAZOLE SODIUM 20 MG/1
40 TABLET, DELAYED RELEASE ORAL
Refills: 0 | Status: DISCONTINUED | OUTPATIENT
Start: 2020-10-25 | End: 2020-10-25

## 2020-10-24 RX ADMIN — Medication 4 MILLIGRAM(S): at 05:41

## 2020-10-24 RX ADMIN — Medication 4 MILLIGRAM(S): at 00:27

## 2020-10-24 RX ADMIN — ENOXAPARIN SODIUM 40 MILLIGRAM(S): 100 INJECTION SUBCUTANEOUS at 11:39

## 2020-10-24 RX ADMIN — AMPICILLIN SODIUM AND SULBACTAM SODIUM 200 GRAM(S): 250; 125 INJECTION, POWDER, FOR SUSPENSION INTRAMUSCULAR; INTRAVENOUS at 05:40

## 2020-10-24 RX ADMIN — ATORVASTATIN CALCIUM 40 MILLIGRAM(S): 80 TABLET, FILM COATED ORAL at 21:31

## 2020-10-24 RX ADMIN — CHLORHEXIDINE GLUCONATE 1 APPLICATION(S): 213 SOLUTION TOPICAL at 07:27

## 2020-10-24 RX ADMIN — Medication 200 MICROGRAM(S): at 05:40

## 2020-10-24 RX ADMIN — AMPICILLIN SODIUM AND SULBACTAM SODIUM 200 GRAM(S): 250; 125 INJECTION, POWDER, FOR SUSPENSION INTRAMUSCULAR; INTRAVENOUS at 11:39

## 2020-10-24 RX ADMIN — AMPICILLIN SODIUM AND SULBACTAM SODIUM 200 GRAM(S): 250; 125 INJECTION, POWDER, FOR SUSPENSION INTRAMUSCULAR; INTRAVENOUS at 17:25

## 2020-10-24 RX ADMIN — AMPICILLIN SODIUM AND SULBACTAM SODIUM 200 GRAM(S): 250; 125 INJECTION, POWDER, FOR SUSPENSION INTRAMUSCULAR; INTRAVENOUS at 23:43

## 2020-10-24 NOTE — PROGRESS NOTE ADULT - SUBJECTIVE AND OBJECTIVE BOX
ENT DAILY PROGRESS NOTE    Pt is a 54yo male a/w throat pain, tonsillitis. Pt seen and examined at bedside this am. Pt reports significant improvement in pain since starting the abx. He is able to tolerate PO and was able to eat breakfast.  Denies difficulty breathing, drooling, fevers or chills.     REVIEW OF SYSTEMS   [x] A ten-point review of systems was otherwise negative except as noted.  [ ] Due to altered mental status/intubation, subjective information were not able to be obtained from patient. History was obtained, to the extent possible, from review of the chart and collateral sources of information.    Allergies  No Known Allergies    MEDICATIONS:  ampicillin/sulbactam  IVPB 3 Gram(s) IV Intermittent every 6 hours  atorvastatin 40 milliGRAM(s) Oral at bedtime  chlorhexidine 4% Liquid 1 Application(s) Topical <User Schedule>  enoxaparin Injectable 40 milliGRAM(s) SubCutaneous daily  influenza   Vaccine 0.5 milliLiter(s) IntraMuscular once  ketorolac   Injectable 15 milliGRAM(s) IV Push every 6 hours PRN  levothyroxine 200 MICROGram(s) Oral daily    Vital Signs Last 24 Hrs  T(C): 35.7 (24 Oct 2020 08:00), Max: 36.2 (23 Oct 2020 12:05)  T(F): 96.3 (24 Oct 2020 08:00), Max: 97.1 (23 Oct 2020 12:05)  HR: 86 (24 Oct 2020 10:00)   BP: 140/87 (24 Oct 2020 10:00)   BP(mean): 107 (24 Oct 2020 10:00)   RR: 13 (24 Oct 2020 10:00)  SpO2: 94% (24 Oct 2020 10:00)       10-23 @ 07:01  -  10-24 @ 07:00  --------------------------------------------------------  IN:    IV PiggyBack: 100 mL  Total IN: 100 mL    OUT:    Voided (mL): 1300 mL  Total OUT: 1300 mL    Total NET: -1200 mL    PHYSICAL EXAM:  GEN: NAD; No drooling or pooling of secretions  NEURO: Awake and alert  SKIN: Good color, non diaphoretic  HEENT: Oral mucosa pink and moist, +erythema and edema noted to b/l tonsils (L>R), no bulging/edema noted to b/l peritonsillar area; uvula midline; no FOM ttp  RESP: Non-labored breathing  CARDIO: +S1/S2  ABDO: Soft, NT  EXT: LESLIE x 4    LABS:  CBC-                        13.7   14.98 )-----------( 288      ( 24 Oct 2020 04:30 )             41.3     BMP/CMP-  24 Oct 2020 04:30    137    |  104    |  11     ----------------------------<  143    4.4     |  22     |  0.9      Ca    9.2        24 Oct 2020 04:30  Phos  3.0       23 Oct 2020 11:00  Mg     2.1       24 Oct 2020 04:30    TPro  6.7    /  Alb  4.1    /  TBili  0.3    /  DBili  x      /  AST  15     /  ALT  28     /  AlkPhos  79     24 Oct 2020 04:30    Coagulation Studies-  PT/INR - ( 23 Oct 2020 11:00 )   PT: 12.90 sec;   INR: 1.12 ratio      PTT - ( 23 Oct 2020 11:00 )  PTT:29.5 sec  Endocrine Panel-  Calcium, Total Serum: 9.2 mg/dL (10-24 @ 04:30)

## 2020-10-24 NOTE — CHART NOTE - NSCHARTNOTEFT_GEN_A_CORE
ICU DOWNGRADE NOTE:    55y Male transferred to floor from ICU    Patient is a 55y old Male who presents with a chief complaint of Throat swelling (23 Oct 2020 15:46)    The patient is currently admitted for the primary diagnosis of Throat swelling      The patient was admitted to the unit for () Days.    The patient (was/was never) intubated for (days), and (was/was never) on pressors for (days).    Indwelling vascular catheters:    Urinary Catheter:     Disposition:    Code Status:    ICU COURSE OF EVENTS:  -------------------------------------------------------------------------------------------        -------------------------------------------------------------------------------------------    Current workup in progress:    SIGN OUT AT 10-24-20 @ 08:27 GIVEN TO:

## 2020-10-24 NOTE — PROGRESS NOTE ADULT - ASSESSMENT
Pt is a 54 yo M a/w throat pain, tonsilitis- clinically improving and now with resolution of pain.    ·	Cont with abx   ·	Cont with diet as tolerated   ·	Repeat CT in 2 weeks  ·	will d/w attng

## 2020-10-25 ENCOUNTER — TRANSCRIPTION ENCOUNTER (OUTPATIENT)
Age: 55
End: 2020-10-25

## 2020-10-25 VITALS — OXYGEN SATURATION: 98 % | RESPIRATION RATE: 18 BRPM

## 2020-10-25 PROCEDURE — 99239 HOSP IP/OBS DSCHRG MGMT >30: CPT

## 2020-10-25 RX ORDER — LEVOTHYROXINE SODIUM 125 MCG
1 TABLET ORAL
Qty: 0 | Refills: 0 | DISCHARGE

## 2020-10-25 RX ORDER — IBUPROFEN 200 MG
1 TABLET ORAL
Qty: 16 | Refills: 0
Start: 2020-10-25 | End: 2020-10-28

## 2020-10-25 RX ORDER — LEVOTHYROXINE SODIUM 125 MCG
1 TABLET ORAL
Qty: 0 | Refills: 0 | DISCHARGE
Start: 2020-10-25

## 2020-10-25 RX ADMIN — AMPICILLIN SODIUM AND SULBACTAM SODIUM 200 GRAM(S): 250; 125 INJECTION, POWDER, FOR SUSPENSION INTRAMUSCULAR; INTRAVENOUS at 05:46

## 2020-10-25 RX ADMIN — Medication 200 MICROGRAM(S): at 05:45

## 2020-10-25 RX ADMIN — PANTOPRAZOLE SODIUM 40 MILLIGRAM(S): 20 TABLET, DELAYED RELEASE ORAL at 05:45

## 2020-10-25 NOTE — DISCHARGE NOTE PROVIDER - NSDCMRMEDTOKEN_GEN_ALL_CORE_FT
Augmentin 875 mg-125 mg oral tablet: 1 tab(s) orally 2 times a day   ibuprofen 400 mg oral tablet: 1 tab(s) orally every 6 hours, As Needed -for severe pain   levothyroxine 200 mcg (0.2 mg) oral tablet: 1 tab(s) orally once a day  rosuvastatin 10 mg oral tablet: 1 tab(s) orally once a day

## 2020-10-25 NOTE — PROGRESS NOTE ADULT - ASSESSMENT
Pt is a 55y M a/w throat pain, tonsillitis- pt report resolution of throat pain; pt being prepared for d/c home today.     ·	Cont with PO abx upon d/c home  ·	Cont with diet as tolerated   ·	Repeat CT in 2 weeks   ·	f/u OP with ENT  ·	Will d/w attng Pt is a 55y M a/w throat pain, tonsillitis- pt report resolution of throat pain; pt being prepared for d/c home today.     ·	Cont with PO abx upon d/c home  ·	Cont with diet as tolerated   ·	Repeat CT in 2 weeks   ·	f/u OP with ENT; pt given card and instructed to call office to schedule appointment   ·	Will d/w attng

## 2020-10-25 NOTE — DISCHARGE NOTE PROVIDER - NSDCCPCAREPLAN_GEN_ALL_CORE_FT
PRINCIPAL DISCHARGE DIAGNOSIS  Diagnosis: Tonsillitis  Assessment and Plan of Treatment:       SECONDARY DISCHARGE DIAGNOSES  Diagnosis: Sore throat  Assessment and Plan of Treatment:

## 2020-10-25 NOTE — PROGRESS NOTE ADULT - ASSESSMENT
# tonsilitis of left tonsil; reactive adenopathy; sepsis present on admit (WBC and HR 95); afeb  feeling better  abx: augmentin 875mg po q12 x10 days  gargle salt water 3x/day  change toothbrush  advil 400mg po q6 prn pain (OTC)  d/c decadron  CMP nl    # hypothyroid - c/w synth    # DLD - c/w crestor    # COVID swab and Ab neg    Dispo: d/c home today

## 2020-10-25 NOTE — DISCHARGE NOTE NURSING/CASE MANAGEMENT/SOCIAL WORK - PATIENT PORTAL LINK FT
You can access the FollowMyHealth Patient Portal offered by [facility] by registering at the following website: [email or web address]. By joining [employer]’s FollowMyHealth portal, you will also be able to view your health information using other applications (apps) compatible with our system.

## 2020-10-25 NOTE — DISCHARGE NOTE PROVIDER - HOSPITAL COURSE
Pt was found to have enlarged left tonsil w/ exudates. Minor cerv adenopathy. Septic on admit (HR and WBC). Went to ICU initially for airway observation. Pt received IV abx and IV decadron and did very well. Was quickly downgraded to med floor. Continued to do well and was d/c'd home in stable condition.

## 2020-10-25 NOTE — PROGRESS NOTE ADULT - SUBJECTIVE AND OBJECTIVE BOX
ENT DAILY PROGRESS NOTE    Pt is a 55y male a/w throat pain/tonsillitis. Pt seen and examined at bedside this am, he was downgraded to the floor from ICU monitoring yesterday. Pt reports he feels much better, states he does not have anymore pain. Is able to tolerate PO intake. Denies difficulty breathing, fevers/ chills.     REVIEW OF SYSTEMS   [x] A ten-point review of systems was otherwise negative except as noted.  [ ] Due to altered mental status/intubation, subjective information were not able to be obtained from patient. History was obtained, to the extent possible, from review of the chart and collateral sources of information.    Allergies  No Known Allergies    Intolerances    MEDICATIONS:  ampicillin/sulbactam  IVPB 3 Gram(s) IV Intermittent every 6 hours  atorvastatin 40 milliGRAM(s) Oral at bedtime  chlorhexidine 4% Liquid 1 Application(s) Topical <User Schedule>  enoxaparin Injectable 40 milliGRAM(s) SubCutaneous daily  influenza   Vaccine 0.5 milliLiter(s) IntraMuscular once  ketorolac   Injectable 15 milliGRAM(s) IV Push every 6 hours PRN  levothyroxine 200 MICROGram(s) Oral daily  pantoprazole    Tablet 40 milliGRAM(s) Oral before breakfast    Vital Signs Last 24 Hrs  T(C): 35.9 (25 Oct 2020 04:40), Max: 36.4 (24 Oct 2020 11:25)  T(F): 96.6 (25 Oct 2020 04:40), Max: 97.5 (24 Oct 2020 11:25)  HR: 68 (25 Oct 2020 04:40)   BP: 131/81 (25 Oct 2020 04:40)   BP(mean): 107 (24 Oct 2020 10:00)  RR: 18 (25 Oct 2020 04:40)  SpO2: 96% (24 Oct 2020 11:25)    10-24 @ 07:01  -  10-25 @ 07:00  --------------------------------------------------------  IN:  Total IN: 0 mL    OUT:    Voided (mL): 450 mL  Total OUT: 450 mL    Total NET: -450 mL    PHYSICAL EXAM:  GEN: NAD; No drooling/pooling of secretions    SKIN: Good color, non diaphoretic  HEENT: Oral mucosa pink and moist; + edema and erythema to L tonsil; no peritonsillar bulging, ttp or erythema; uvula midline   RESP: Non-labored breathing  CARDIO: +S1/S2  ABDO: Soft, NT  EXT: LESLIE x 4    LABS:  CBC-                        13.7   14.98 )-----------( 288      ( 24 Oct 2020 04:30 )             41.3     BMP/CMP-  24 Oct 2020 04:30    137    |  104    |  11     ----------------------------<  143    4.4     |  22     |  0.9      Ca    9.2        24 Oct 2020 04:30  Phos  3.0       23 Oct 2020 11:00  Mg     2.1       24 Oct 2020 04:30    TPro  6.7    /  Alb  4.1    /  TBili  0.3    /  DBili  x      /  AST  15     /  ALT  28     /  AlkPhos  79     24 Oct 2020 04:30    Coagulation Studies-  PT/INR - ( 23 Oct 2020 11:00 )   PT: 12.90 sec;   INR: 1.12 ratio       PTT - ( 23 Oct 2020 11:00 )  PTT:29.5 sec

## 2020-10-25 NOTE — PROGRESS NOTE ADULT - SUBJECTIVE AND OBJECTIVE BOX
DIANA SHAW  55y  Male  ***My note supersedes ALL resident notes that I sign.  My corrections for their notes are in my note.***    I can be reached directly on [employer] [id number]. My office number is [phone]. My personal cell number is [phone].    INTERVAL EVENTS: Here for f/u of tonsilitis. Pt is feeling much better and would like to go home. Breathing is fine. Can swallow food OK, no choking.    T(F): 96.6 (10-25-20 @ 04:40), Max: 97.5 (10-24-20 @ 11:25)  HR: 68 (10-25-20 @ 04:40) (68 - 86)  BP: 131/81 (10-25-20 @ 04:40) (104/60 - 144/82)  RR: 18 (10-25-20 @ 04:40) (13 - 20)  SpO2: 96% (10-24-20 @ 11:25) (92% - 96%)    Gen: NAD; looks well  HEENT: PERRL, EOMI, mouth: + pharyngeal redness; left tonsil is enlarged; no exudates; minor uvular swelling; slight incr rt tonsil (no exudates); no vesicles, no thrush; nose clr  Neck: minor swelling to left cerv nodes - non-tender, no JVD, thyroid nl  lungs: clr  hrt: s1 s2 rrr no murmur  abd: soft, NT/ND, no HS megaly  ext: no edema, no c/c  neuro: aa ox3, cn intact, can move all 4 ext    LABS:                      13.7    (    92.2   14.98 )-----------( ---------      288      ( 24 Oct 2020 04:30 )             41.3    (    13.0     WBC Count: 14.98 K/uL (10-24-20 @ 04:30) - got decadron  WBC Count: 14.34 K/uL (10-23-20 @ 11:00)  WBC Count: 16.33 K/uL (10-23-20 @ 02:55)    PT/INR - ( 23 Oct 2020 11:00 )   PT: 12.90 sec;   INR: 1.12 ratio    PTT - ( 23 Oct 2020 11:00 )  PTT:29.5 sec    RADIOLOGY & ADDITIONAL TESTS:  < from: CT Neck Soft Tissue w/ IV Cont (10.23.20 @ 05:20) >  Findings:    Streak artifact from the patient's dental apparatus limits evaluation at the level of the soft palate and tongue.    The tonsils are enlarged (left greater than right) and there is a striated enhancing pattern consistent with tonsillitis. No discrete enhancing fluid collection is identified. There is moderate narrowing of the airway. Punctate dystrophic calcification within the left tonsil.    Bilateral enlarged level II cervical lymph nodes likely reactive.    Minimal mucosal thickening of the right maxillary sinus.    The parotid glands and submandibular glands are normal in size and CT attenuation.    Mildly enlarged right lobe of the thyroid gland.    Impression:    The palatine tonsils are enlarged (left greater than right) with a striated enhancement pattern consistent with tonsillitis with moderate narrowing of the airway. Bilateral mildly enlarged cervical lymph nodes likely reactive.    < end of copied text >    MEDICATIONS:  ampicillin/sulbactam  IVPB 3 Gram(s) IV Intermittent every 6 hours    atorvastatin 40 milliGRAM(s) Oral at bedtime  chlorhexidine 4% Liquid 1 Application(s) Topical <User Schedule>  enoxaparin Injectable 40 milliGRAM(s) SubCutaneous daily  influenza   Vaccine 0.5 milliLiter(s) IntraMuscular once  ketorolac   Injectable 15 milliGRAM(s) IV Push every 6 hours PRN  levothyroxine 200 MICROGram(s) Oral daily  pantoprazole    Tablet 40 milliGRAM(s) Oral before breakfast

## 2020-10-25 NOTE — DISCHARGE NOTE PROVIDER - NSDCFUADDINST_GEN_ALL_CORE_FT
Discard old toothbrush and buy new one  Gargle w/ salt water 3x/day Discard old toothbrush and buy new one  Gargle w/ salt water 3x/day  Please follow up with your ENT doctor, Dr. Moscoso. Number was provided.

## 2020-10-25 NOTE — DISCHARGE NOTE PROVIDER - CARE PROVIDER_API CALL
GILLIAN ARRIOLA  [id number]  [address] [facility] [address]  [place] [address]  Phone: ([phone]  Fax: ([phone]  Established Patient  Follow Up Time: 2 weeks   GILLIAN ARRIOLA  [id number]  [address]  [place] [address]  Phone: ()-  Fax: ()-  Established Patient  Follow Up Time: 2 weeks    Alexa Moscoso  [facility]  [address] [place] [address], [address] [place] [address]  Phone: [phone]  Fax: [phone]  Follow Up Time: 2 weeks

## 2020-10-25 NOTE — DISCHARGE NOTE PROVIDER - CARE PROVIDERS DIRECT ADDRESSES
,DirectAddress_Unknown ,DirectAddress_Unknown,[email or web address]

## 2020-10-25 NOTE — DISCHARGE NOTE PROVIDER - PROVIDER TOKENS
PROVIDER:[TOKEN:[[id number]],FOLLOWUP:[2 weeks],ESTABLISHEDPATIENT:[T]] PROVIDER:[TOKEN:[[id number]],FOLLOWUP:[2 weeks],ESTABLISHEDPATIENT:[T]],PROVIDER:[TOKEN:[[id number]],FOLLOWUP:[2 weeks]]

## 2020-10-29 DIAGNOSIS — A41.9 SEPSIS, UNSPECIFIED ORGANISM: ICD-10-CM

## 2020-10-29 DIAGNOSIS — J03.90 ACUTE TONSILLITIS, UNSPECIFIED: ICD-10-CM

## 2020-10-29 DIAGNOSIS — E78.5 HYPERLIPIDEMIA, UNSPECIFIED: ICD-10-CM

## 2020-10-29 DIAGNOSIS — Z20.828 CONTACT WITH AND (SUSPECTED) EXPOSURE TO OTHER VIRAL COMMUNICABLE DISEASES: ICD-10-CM

## 2020-10-29 DIAGNOSIS — Z79.890 HORMONE REPLACEMENT THERAPY: ICD-10-CM

## 2020-10-29 DIAGNOSIS — J02.9 ACUTE PHARYNGITIS, UNSPECIFIED: ICD-10-CM

## 2020-10-29 DIAGNOSIS — E03.9 HYPOTHYROIDISM, UNSPECIFIED: ICD-10-CM

## 2020-10-29 LAB
CULTURE RESULTS: SIGNIFICANT CHANGE UP
SPECIMEN SOURCE: SIGNIFICANT CHANGE UP

## 2020-11-18 ENCOUNTER — EMERGENCY (EMERGENCY)
Facility: HOSPITAL | Age: 55
LOS: 0 days | Discharge: HOME | End: 2020-11-18
Attending: EMERGENCY MEDICINE | Admitting: EMERGENCY MEDICINE
Payer: COMMERCIAL

## 2020-11-18 VITALS
HEIGHT: 76 IN | HEART RATE: 108 BPM | SYSTOLIC BLOOD PRESSURE: 131 MMHG | TEMPERATURE: 99 F | OXYGEN SATURATION: 95 % | WEIGHT: 315 LBS | DIASTOLIC BLOOD PRESSURE: 67 MMHG

## 2020-11-18 DIAGNOSIS — E78.5 HYPERLIPIDEMIA, UNSPECIFIED: ICD-10-CM
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
## 2020-11-18 DIAGNOSIS — Z79.2 LONG TERM (CURRENT) USE OF ANTIBIOTICS: ICD-10-CM

## 2020-11-18 DIAGNOSIS — Z79.899 OTHER LONG TERM (CURRENT) DRUG THERAPY: ICD-10-CM

## 2020-11-18 DIAGNOSIS — M25.571 PAIN IN RIGHT ANKLE AND JOINTS OF RIGHT FOOT: ICD-10-CM

## 2020-11-18 DIAGNOSIS — M25.471 EFFUSION, RIGHT ANKLE: ICD-10-CM
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
## 2020-11-18 DIAGNOSIS — E03.9 HYPOTHYROIDISM, UNSPECIFIED: ICD-10-CM

## 2020-11-18 PROCEDURE — 73610 X-RAY EXAM OF ANKLE: CPT | Mod: 26,RT

## 2020-11-18 PROCEDURE — 29515 APPLICATION SHORT LEG SPLINT: CPT

## 2020-11-18 PROCEDURE — 99283 EMERGENCY DEPT VISIT LOW MDM: CPT | Mod: 25

## 2020-11-18 RX ORDER — IBUPROFEN 200 MG
600 TABLET ORAL ONCE
Refills: 0 | Status: COMPLETED | OUTPATIENT
Start: 2020-11-18 | End: 2020-11-18

## 2020-11-18 RX ADMIN — Medication 600 MILLIGRAM(S): at 05:40

## 2020-11-18 NOTE — ED PROVIDER NOTE - ATTENDING CONTRIBUTION TO CARE
55M p/w R ankle pain x 2d. 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 Able to bear wt but w/worsening pain/swelling to lat ankle since then. No focal numbness or weakness.    PE:  nad  skin warm, dry  ncat  neck supple  rrr nl s1s2 no mrg  ctab no wrr  abd soft ntnd no palpable masses no rgr  back non-tender no cvat  ext- R ankle +ttp/swelling to lat mall, non-ttp med mall, navicular & base of 5th MT, full rom/strength/sensation throughout ankle & foot, dpi, cr<2s, R knee non-ttp; remainder of ext no cce dpi  neuro aaox3 grossly nf exam

## 2020-11-18 NOTE — ED ADULT NURSE NOTE - NSIMPLEMENTINTERV_GEN_ALL_ED
Implemented All Fall with Harm Risk Interventions:  [place] to call system. Call bell, personal items and telephone within reach. Instruct patient to call for assistance. Room bathroom lighting operational. Non-slip footwear when patient is off stretcher. Physically safe environment: no spills, clutter or unnecessary equipment. Stretcher in lowest position, wheels locked, appropriate side rails in place. Provide visual cue, wrist band, yellow gown, etc. Monitor gait and stability. Monitor for mental status changes and reorient to person, place, and time. Review medications for side effects contributing to fall risk. Reinforce activity limits and safety measures with patient and family. Provide visual clues: red socks.

## 2020-11-18 NOTE — ED ADULT NURSE NOTE - OBJECTIVE STATEMENT
Pt presents to ED c/o R ankle pain. Pt stated lost balance yesterday at work and stated "may have sprained ankle". Pt c/o difficulty ambulating. Pt is awake alert and not in any distress.

## 2020-11-18 NOTE — ED PROVIDER NOTE - CLINICAL SUMMARY MEDICAL DECISION MAKING FREE TEXT BOX
traumatic R ankle pain x 2d - analgesia, xr no bony injuries, likely sprain - ace wrap provided, rec RICE, analgesia, return precautions discussed, op Ortho f/u

## 2020-11-18 NOTE — ED PROVIDER NOTE - OBJECTIVE STATEMENT
56 yo M with PMHx of hypothyroidism and HLD presents to the ED c/o moderate right ankle pain s/p twisting injury yesterday. Pt is able to bear weight but states walking makes pain worse. He denies taking medication to improve symptoms. She denies other complaints. Pt denies fever, chills, numbness, weakness.

## 2020-11-18 NOTE — ED ADULT TRIAGE NOTE - CHIEF COMPLAINT QUOTE
it looks like I sprained my right leg, it happened yesterday when I was pulling up steel  (right lower leg/ankle) - patient

## 2020-11-18 NOTE — ED PROVIDER NOTE - NS ED ROS FT
Review of Systems  Constitutional:  No fever, chills.  MS:  (+) right ankle pain  Skin:  No skin rash, pruritis, jaundice, or lesions.  Neuro:  No numbness, weakness.

## 2020-12-11 NOTE — ED ADULT TRIAGE NOTE - TEMPERATURE IN FAHRENHEIT (DEGREES F)
Department of Plastic Surgery - Adult  Attending Consult Note          CHIEF COMPLAINT:  Abdominal skin and adipose laxity    History Obtained From:  patient    BMI: 36.75    HISTORY OF PRESENT ILLNESS:                The patient is a 57 y.o. male who presents with excess abdominal skin. The patient states that they have had their abdominal panniculusand laxity for several years. The panus is a result of massive weight los. They state that they are at a stable weight at this time and have had a history of over 250l weight loss over the past several years. He has done keto dieting for over 2 years. The panniculus is associated with, rash, wounding, pruritis, and masceration of the dermis. The patient does complain of intertrtigo with dermatitis occuring on the opposed surface of the skin. The patient does have a history of infection. The patient states that the panus hangs below the level of the pubis, and has  had a history of associated genital infection. The patient states that their associated syptoms have been recurrent for over 3 months with conervative therapy for these wounds. The patient states that secondary to their symptomatic abdominal panniculus they have difficulty with keeping good hygine on a daily basis , and this has been going on for greater than 3 months. After the patient described his abdominal panniculus he states that his main concern is his symptomatic mons pubis. He states that after his deflation of the mons pubis after large weight loss this is most concerning areas this is causing him rash and wounding to his bilateral groins.     Past Medical History:    Past Medical History:   Diagnosis Date    Bipolar disorder, current episode mixed, mild ([address] 10/20/2020    Diabetes mellitus ([address]     PTSD (post-traumatic stress disorder) 10/20/2020     Past Surgical History:    Past Surgical History:   Procedure Laterality Date    APPENDECTOMY       Current Medications: Current Outpatient Medications   Medication Sig Dispense Refill    QUEtiapine (SEROQUEL XR) 50 MG extended release tablet Take 1 tablet by mouth nightly (Patient not taking: Reported on 12/11/2020) 30 tablet 2    escitalopram (LEXAPRO) 10 MG tablet Take 1 tablet by mouth daily (Patient not taking: Reported on 12/11/2020) 30 tablet 5    gabapentin (NEURONTIN) 300 MG capsule Take 1 capsule by mouth nightly for 180 days. Intended supply: 30 days (Patient not taking: Reported on 12/11/2020) 30 capsule 5    metFORMIN (GLUCOPHAGE-XR) 500 MG extended release tablet Take 1 tablet by mouth daily (with breakfast) (Patient not taking: Reported on 12/11/2020) 30 tablet 5     No current facility-administered medications for this visit. Allergies:  Patient has no known allergies. Social History:   Social History     Socioeconomic History    Marital status:      Spouse name: Not on file    Number of children: Not on file    Years of education: Not on file    Highest education level: Not on file   Occupational History    Not on file   Social Needs    Financial resource strain: Not on file    Food insecurity     Worry: Not on file     Inability: Not on file   [employer] needs     Medical: Not on file     Non-medical: Not on file   Tobacco Use    Smoking status: Never Smoker    Smokeless tobacco: Never Used   Substance and Sexual Activity    Alcohol use:  Yes     Alcohol/week: 8.0 standard drinks     Types: 8 Cans of beer per week    Drug use: No    Sexual activity: Yes   Lifestyle    Physical activity     Days per week: Not on file     Minutes per session: Not on file    Stress: Not on file   Relationships    Social connections     Talks on phone: Not on file     Gets together: Not on file     Attends Nondenominational service: Not on file     Active member of club or organization: Not on file     Attends meetings of clubs or organizations: Not on file     Relationship status: Not on file   [facility] Intimate partner violence     Fear of current or ex partner: Not on file     Emotionally abused: Not on file     Physically abused: Not on file     Forced sexual activity: Not on file   Other Topics Concern    Not on file   Social History Narrative    Not on file     Family History:   Family History   Problem Relation Age of Onset    Diabetes Brother     Obesity Mother     Heart Disease Brother     Obesity Maternal Grandfather        REVIEW OF SYSTEMS:    CONSTITUTIONAL:  negative for  fevers, chills, sweats and fatigue  EYES: negative for dipolpia or acute vision loss. RESPIRATORY:  negative for  dry cough, cough with sputum, dyspnea, wheezing and chest pain  CARDIOVASCULAR:  negative for  chest pain, dyspnea, palpitations, syncope  GASTROINTESTINAL:  negative for nausea, vomiting, change in bowel habits, diarrhea, constipation and abdominal pain  EXTREMITIES: negative for edema  MUSCULOSKELETAL: negative for muscle weakness  SKIN: negative for itching or rashes. BEHAVIOR/PSYCH:  negative for poor appetite, increased appetite, decreased sleep and poor concentration    All other systems negative      PHYSICAL EXAM:    VITALS:  /82 (Site: Left Upper Arm, Position: Sitting, Cuff Size: Large Adult)   Pulse 81   Temp 98.6 °F (37 °C) (Temporal)   Resp 16   Ht 6' (1.829 m)   Wt 271 lb (122.9 kg)   SpO2 97%   BMI 36.75 kg/m²   CONSTITUTIONAL:  awake, alert, cooperative, no apparent distress, and appears stated age  EYES: PERRLA, EOMI, no signs of occular infection  LUNGS:  No increased work of breathing, good air exchange,   CARDIOVASCULAR:  Normal apical impulse, regular rate and rhythm,   ABDOMEN:  scars noted, normal bowel sounds, soft, non-distended, non-tender, no masses palpated, hernia absent. The patient does exhibit an abdominal pannus which does  hang below the level of the mons pubis and over the genital area.   There is noted hyperhidrosis to the opposing skin folds of the abdominal 98.6

## 2022-01-27 ENCOUNTER — TRANSCRIPTION ENCOUNTER (OUTPATIENT)
Age: 57
End: 2022-01-27

## 2023-04-22 NOTE — ED PROVIDER NOTE - CARE PROVIDER_API CALL
Chas Hanna  ORTHOPAEDIC SURGERY  [address] [place] [address]  Phone: [phone]  Fax: [phone]  Follow Up Time: 1-3 Days   Applied

## 2023-10-02 ENCOUNTER — EMERGENCY (EMERGENCY)
Facility: HOSPITAL | Age: 58
LOS: 0 days | Discharge: HOME | End: 2023-10-02
Attending: EMERGENCY MEDICINE
Payer: COMMERCIAL

## 2023-10-02 VITALS
WEIGHT: 315 LBS | SYSTOLIC BLOOD PRESSURE: 150 MMHG | HEART RATE: 92 BPM | DIASTOLIC BLOOD PRESSURE: 104 MMHG | OXYGEN SATURATION: 99 % | RESPIRATION RATE: 18 BRPM | HEIGHT: 76 IN | TEMPERATURE: 96 F

## 2023-10-02 DIAGNOSIS — M25.562 PAIN IN LEFT KNEE: ICD-10-CM

## 2023-10-02 DIAGNOSIS — E78.5 HYPERLIPIDEMIA, UNSPECIFIED: ICD-10-CM

## 2023-10-02 DIAGNOSIS — R07.89 OTHER CHEST PAIN: ICD-10-CM

## 2023-10-02 DIAGNOSIS — E03.9 HYPOTHYROIDISM, UNSPECIFIED: ICD-10-CM

## 2023-10-02 LAB
ALBUMIN SERPL ELPH-MCNC: 4.5 G/DL — SIGNIFICANT CHANGE UP (ref 3.5–5.2)
ALP SERPL-CCNC: 66 U/L — SIGNIFICANT CHANGE UP (ref 30–115)
ALT FLD-CCNC: 35 U/L — SIGNIFICANT CHANGE UP (ref 0–41)
ANION GAP SERPL CALC-SCNC: 9 MMOL/L — SIGNIFICANT CHANGE UP (ref 7–14)
AST SERPL-CCNC: 43 U/L — HIGH (ref 0–41)
BASOPHILS # BLD AUTO: 0.06 K/UL — SIGNIFICANT CHANGE UP (ref 0–0.2)
BASOPHILS NFR BLD AUTO: 0.7 % — SIGNIFICANT CHANGE UP (ref 0–1)
BILIRUB SERPL-MCNC: 0.2 MG/DL — SIGNIFICANT CHANGE UP (ref 0.2–1.2)
BUN SERPL-MCNC: 18 MG/DL — SIGNIFICANT CHANGE UP (ref 10–20)
CALCIUM SERPL-MCNC: 9.5 MG/DL — SIGNIFICANT CHANGE UP (ref 8.4–10.5)
CHLORIDE SERPL-SCNC: 100 MMOL/L — SIGNIFICANT CHANGE UP (ref 98–110)
CO2 SERPL-SCNC: 25 MMOL/L — SIGNIFICANT CHANGE UP (ref 17–32)
CREAT SERPL-MCNC: 1.1 MG/DL — SIGNIFICANT CHANGE UP (ref 0.7–1.5)
EGFR: 79 ML/MIN/1.73M2 — SIGNIFICANT CHANGE UP
EOSINOPHIL # BLD AUTO: 0.13 K/UL — SIGNIFICANT CHANGE UP (ref 0–0.7)
EOSINOPHIL NFR BLD AUTO: 1.6 % — SIGNIFICANT CHANGE UP (ref 0–8)
GLUCOSE SERPL-MCNC: 97 MG/DL — SIGNIFICANT CHANGE UP (ref 70–99)
HCT VFR BLD CALC: 40.8 % — LOW (ref 42–52)
HGB BLD-MCNC: 14.2 G/DL — SIGNIFICANT CHANGE UP (ref 14–18)
IMM GRANULOCYTES NFR BLD AUTO: 0.4 % — HIGH (ref 0.1–0.3)
LYMPHOCYTES # BLD AUTO: 1.86 K/UL — SIGNIFICANT CHANGE UP (ref 1.2–3.4)
LYMPHOCYTES # BLD AUTO: 22.4 % — SIGNIFICANT CHANGE UP (ref 20.5–51.1)
MCHC RBC-ENTMCNC: 31.1 PG — HIGH (ref 27–31)
MCHC RBC-ENTMCNC: 34.8 G/DL — SIGNIFICANT CHANGE UP (ref 32–37)
MCV RBC AUTO: 89.3 FL — SIGNIFICANT CHANGE UP (ref 80–94)
MONOCYTES # BLD AUTO: 0.84 K/UL — HIGH (ref 0.1–0.6)
MONOCYTES NFR BLD AUTO: 10.1 % — HIGH (ref 1.7–9.3)
NEUTROPHILS # BLD AUTO: 5.39 K/UL — SIGNIFICANT CHANGE UP (ref 1.4–6.5)
NEUTROPHILS NFR BLD AUTO: 64.8 % — SIGNIFICANT CHANGE UP (ref 42.2–75.2)
NRBC # BLD: 0 /100 WBCS — SIGNIFICANT CHANGE UP (ref 0–0)
PLATELET # BLD AUTO: 291 K/UL — SIGNIFICANT CHANGE UP (ref 130–400)
PMV BLD: 10.1 FL — SIGNIFICANT CHANGE UP (ref 7.4–10.4)
POTASSIUM SERPL-MCNC: 5.7 MMOL/L — HIGH (ref 3.5–5)
POTASSIUM SERPL-SCNC: 5.7 MMOL/L — HIGH (ref 3.5–5)
PROT SERPL-MCNC: 7.2 G/DL — SIGNIFICANT CHANGE UP (ref 6–8)
RBC # BLD: 4.57 M/UL — LOW (ref 4.7–6.1)
RBC # FLD: 13.2 % — SIGNIFICANT CHANGE UP (ref 11.5–14.5)
SODIUM SERPL-SCNC: 134 MMOL/L — LOW (ref 135–146)
TROPONIN T SERPL-MCNC: <0.01 NG/ML — SIGNIFICANT CHANGE UP
WBC # BLD: 8.31 K/UL — SIGNIFICANT CHANGE UP (ref 4.8–10.8)
WBC # FLD AUTO: 8.31 K/UL — SIGNIFICANT CHANGE UP (ref 4.8–10.8)

## 2023-10-02 PROCEDURE — 80053 COMPREHEN METABOLIC PANEL: CPT

## 2023-10-02 PROCEDURE — 99285 EMERGENCY DEPT VISIT HI MDM: CPT | Mod: 25

## 2023-10-02 PROCEDURE — 84484 ASSAY OF TROPONIN QUANT: CPT

## 2023-10-02 PROCEDURE — 93005 ELECTROCARDIOGRAM TRACING: CPT

## 2023-10-02 PROCEDURE — 93010 ELECTROCARDIOGRAM REPORT: CPT

## 2023-10-02 PROCEDURE — 71046 X-RAY EXAM CHEST 2 VIEWS: CPT

## 2023-10-02 PROCEDURE — 71260 CT THORAX DX C+: CPT | Mod: MA

## 2023-10-02 PROCEDURE — 73030 X-RAY EXAM OF SHOULDER: CPT | Mod: 26,LT

## 2023-10-02 PROCEDURE — 99285 EMERGENCY DEPT VISIT HI MDM: CPT

## 2023-10-02 PROCEDURE — 85025 COMPLETE CBC W/AUTO DIFF WBC: CPT

## 2023-10-02 PROCEDURE — 71260 CT THORAX DX C+: CPT | Mod: 26,MA

## 2023-10-02 PROCEDURE — 71046 X-RAY EXAM CHEST 2 VIEWS: CPT | Mod: 26

## 2023-10-02 PROCEDURE — 73030 X-RAY EXAM OF SHOULDER: CPT | Mod: LT

## 2023-10-02 PROCEDURE — 36415 COLL VENOUS BLD VENIPUNCTURE: CPT

## 2023-10-02 PROCEDURE — 96372 THER/PROPH/DIAG INJ SC/IM: CPT | Mod: XU

## 2023-10-02 RX ORDER — KETOROLAC TROMETHAMINE 30 MG/ML
60 SYRINGE (ML) INJECTION ONCE
Refills: 0 | Status: DISCONTINUED | OUTPATIENT
Start: 2023-10-02 | End: 2023-10-02

## 2023-10-02 RX ORDER — ACETAMINOPHEN 500 MG
650 TABLET ORAL ONCE
Refills: 0 | Status: COMPLETED | OUTPATIENT
Start: 2023-10-02 | End: 2023-10-02

## 2023-10-02 RX ORDER — ROSUVASTATIN CALCIUM 5 MG/1
1 TABLET ORAL
Qty: 0 | Refills: 0 | DISCHARGE

## 2023-10-02 RX ADMIN — Medication 60 MILLIGRAM(S): at 16:13

## 2023-10-02 RX ADMIN — Medication 650 MILLIGRAM(S): at 15:30

## 2023-10-02 NOTE — ED PROVIDER NOTE - OBJECTIVE STATEMENT
58 year old male with pmhx of hypothyroidism presents to ed with left shoulder and anterior chest wall pain. pt admits metal beam fell onto left shoulder at work. Pt denies sob, head trauma, loc, neck pain, paresthesias or weakness.

## 2023-10-02 NOTE — ED ADULT NURSE NOTE - NSFALLUNIVINTERV_ED_ALL_ED
Bed/Stretcher in lowest position, wheels locked, appropriate side rails in place/Call bell, personal items and telephone in reach/Instruct patient to call for assistance before getting out of bed/chair/stretcher/Non-slip footwear applied when patient is off stretcher/[place] to call system/Physically safe environment - no spills, clutter or unnecessary equipment/Purposeful proactive rounding/Room/bathroom lighting operational, light cord in reach

## 2023-10-02 NOTE — ED ADULT TRIAGE NOTE - STATUS:
Resending to include IC-D 10 code in order for insurance to cover medication. Just needs your approval.    Intact

## 2023-10-02 NOTE — ED PROVIDER NOTE - PATIENT PORTAL LINK FT
You can access the FollowMyHealth Patient Portal offered by [facility] by registering at the following website: [email or web address]. By joining [facility]’s FollowMyHealth portal, you will also be able to view your health information using other applications (apps) compatible with our system.

## 2023-10-02 NOTE — ED PROVIDER NOTE - PHYSICAL EXAMINATION
CONST: Well appearing in NAD  EYES: PERRL, EOMI, Sclera and conjunctiva clear.   ENT: No nasal discharge. Oropharynx normal appearing  NECK: Non-tender, no meningeal signs. normal ROM. supple   CARD: Normal S1 S2; Normal rate and rhythm  RESP: Equal BS B/L, No wheezes, rhonchi or rales. No distress  GI: Soft, non-tender, non-distended. no cva tenderness. normal BS  MS: tenderness to left shoulder and left anterior chest wall. Normal ROM in all extremities. No midline spinal tenderness. pulses 2 +.   SKIN: Warm, dry, no acute rashes. Good turgor  NEURO: A&Ox3, No focal deficits.

## 2023-10-02 NOTE — ED PROVIDER NOTE - CARE PLAN
Assessment and plan of treatment:	plan - xr meds   1 Principal Discharge DX:	Left shoulder pain  Assessment and plan of treatment:	plan - xr meds

## 2023-10-02 NOTE — ED PROVIDER NOTE - ATTENDING APP SHARED VISIT CONTRIBUTION OF CARE
58-year-old male past medical history hypothyroidism presents to the emergency department for evaluation of left shoulder injury.  Patient reports he was lifting a very heavy steel beam while he was standing on a ladder which fell on his anterior chest and left shoulder.  Patient reports he was able to jump off the ladder landed on his feet no head trauma or LOC.  Patient reports constant pain to left shoulder and anterior chest, worse with movement.  No shortness of breath, nausea, vomiting, abdominal pain, numbness, weakness.    CONSTITUTIONAL: NAD.   SKIN: warm, dry  HEAD: Normocephalic; atraumatic.  EYES: no conjunctival injection.    ENT: MMM.   NECK: Supple.  CARD: RRR.   RESP: No wheezes, rales or rhonchi. (+) L anterior chest wall tenderness no overlying ecchymoses or abrasions.   ABD: soft ntnd.   BACK: no midline tenderness or stepoffs. (+) L scapular tenderness, no ecchymoses. (+) L upper thoracic paraspinal tenderness.   EXT: (+) L humerus tenderness no ecchymoses full ROM of UE and LE, distal pulses intact.    NEURO: Alert, oriented, grossly unremarkable. 5/5 strength and sensation to UE and LE. No FND.   PSYCH: Cooperative, appropriate.

## 2024-09-23 NOTE — H&P ADULT - NSHPPOADEEPVENOUSTHROMB_GEN_A_CORE
no Photo Preface (Leave Blank If You Do Not Want): Photographs were obtained today Details (Free Text): Treatment #1 Detail Level: Detailed

## 2024-11-04 NOTE — ED ADULT NURSE NOTE - CAS ELECT INFOMATION PROVIDED
#15 Mg drain  Pt. educated on proper crutch training. Pt. able to return demonstrate./DC instructions

## 2024-12-02 NOTE — ED ADULT NURSE REASSESSMENT NOTE - NS ED NURSE REASSESS COMMENT FT1
pt resting comfortably in bed. no complaints of pain or discomfort at this time. VSS. will continue to monitor.
Her/She

## 2025-02-02 ENCOUNTER — EMERGENCY (EMERGENCY)
Facility: HOSPITAL | Age: 60
LOS: 0 days | Discharge: HOME | End: 2025-02-02
Attending: STUDENT IN AN ORGANIZED HEALTH CARE EDUCATION/TRAINING PROGRAM
Payer: COMMERCIAL

## 2025-02-02 VITALS
WEIGHT: 315 LBS | RESPIRATION RATE: 18 BRPM | DIASTOLIC BLOOD PRESSURE: 95 MMHG | HEART RATE: 92 BPM | TEMPERATURE: 98 F | SYSTOLIC BLOOD PRESSURE: 142 MMHG | HEIGHT: 76 IN | OXYGEN SATURATION: 95 %

## 2025-02-02 DIAGNOSIS — L03.213 PERIORBITAL CELLULITIS: ICD-10-CM

## 2025-02-02 DIAGNOSIS — R05.1 ACUTE COUGH: ICD-10-CM

## 2025-02-02 DIAGNOSIS — H57.89 OTHER SPECIFIED DISORDERS OF EYE AND ADNEXA: ICD-10-CM

## 2025-02-02 DIAGNOSIS — H10.9 UNSPECIFIED CONJUNCTIVITIS: ICD-10-CM

## 2025-02-02 DIAGNOSIS — E03.9 HYPOTHYROIDISM, UNSPECIFIED: ICD-10-CM

## 2025-02-02 DIAGNOSIS — R09.81 NASAL CONGESTION: ICD-10-CM

## 2025-02-02 DIAGNOSIS — M10.9 GOUT, UNSPECIFIED: ICD-10-CM

## 2025-02-02 DIAGNOSIS — J02.9 ACUTE PHARYNGITIS, UNSPECIFIED: ICD-10-CM

## 2025-02-02 DIAGNOSIS — E78.00 PURE HYPERCHOLESTEROLEMIA, UNSPECIFIED: ICD-10-CM

## 2025-02-02 PROCEDURE — 99284 EMERGENCY DEPT VISIT MOD MDM: CPT

## 2025-02-02 PROCEDURE — 99283 EMERGENCY DEPT VISIT LOW MDM: CPT

## 2025-02-02 RX ORDER — AMOXICILLIN AND CLAVULANATE POTASSIUM 500; 125 MG/1; MG/1
1 TABLET, FILM COATED ORAL
Qty: 20 | Refills: 0
Start: 2025-02-02 | End: 2025-02-11

## 2025-07-14 NOTE — ED PROVIDER NOTE - HIV OFFER
Detail Level: Detailed Quality 226: Preventive Care And Screening: Tobacco Use: Screening And Cessation Intervention: Patient screened for tobacco use and is an ex/non-smoker Opt out